# Patient Record
Sex: MALE | Race: WHITE | NOT HISPANIC OR LATINO | Employment: FULL TIME | URBAN - METROPOLITAN AREA
[De-identification: names, ages, dates, MRNs, and addresses within clinical notes are randomized per-mention and may not be internally consistent; named-entity substitution may affect disease eponyms.]

---

## 2023-10-22 ENCOUNTER — APPOINTMENT (EMERGENCY)
Dept: CT IMAGING | Facility: HOSPITAL | Age: 18
End: 2023-10-22
Payer: COMMERCIAL

## 2023-10-22 ENCOUNTER — HOSPITAL ENCOUNTER (EMERGENCY)
Facility: HOSPITAL | Age: 18
Discharge: HOME | End: 2023-10-22
Attending: STUDENT IN AN ORGANIZED HEALTH CARE EDUCATION/TRAINING PROGRAM
Payer: COMMERCIAL
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
## 2023-10-22 DIAGNOSIS — S16.1XXA ACUTE STRAIN OF NECK MUSCLE, INITIAL ENCOUNTER: ICD-10-CM

## 2023-10-22 DIAGNOSIS — M54.2 NECK PAIN: ICD-10-CM

## 2023-10-22 PROCEDURE — 70450 CT HEAD/BRAIN W/O DYE: CPT

## 2023-10-22 PROCEDURE — 99284 EMERGENCY DEPT VISIT MOD MDM: CPT

## 2023-10-22 PROCEDURE — 99284 EMERGENCY DEPT VISIT MOD MDM: CPT | Performed by: STUDENT IN AN ORGANIZED HEALTH CARE EDUCATION/TRAINING PROGRAM

## 2023-10-22 PROCEDURE — G1004 CDSM NDSC: HCPCS

## 2023-10-22 PROCEDURE — 93005 ELECTROCARDIOGRAM TRACING: CPT

## 2023-10-22 PROCEDURE — 72125 CT NECK SPINE W/O DYE: CPT

## 2023-10-22 RX ORDER — ACETAMINOPHEN 325 MG/1
650 TABLET ORAL ONCE
Status: COMPLETED | OUTPATIENT
Start: 2023-10-22 | End: 2023-10-22

## 2023-10-22 RX ADMIN — ACETAMINOPHEN 650 MG: 325 TABLET, FILM COATED ORAL at 19:24

## 2023-10-22 NOTE — Clinical Note
Sandro Son was seen and treated in our emergency department on 10/22/2023. Diagnosis:     Meño Gonzalez  may return to work on return date. He may return on this date: 10/24/2023         If you have any questions or concerns, please don't hesitate to call.       Sophy Cheung, DO    ______________________________           _______________          _______________  Hospital Representative                              Date                                Time
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
## 2023-10-23 LAB
ATRIAL RATE: 100 BPM
P AXIS: 36 DEGREES
PR INTERVAL: 158 MS
QRS AXIS: 84 DEGREES
QRSD INTERVAL: 88 MS
QT INTERVAL: 334 MS
QTC INTERVAL: 430 MS
T WAVE AXIS: 14 DEGREES
VENTRICULAR RATE: 100 BPM

## 2023-10-23 PROCEDURE — 93010 ELECTROCARDIOGRAM REPORT: CPT | Performed by: INTERNAL MEDICINE

## 2023-10-25 VITALS
SYSTOLIC BLOOD PRESSURE: 145 MMHG | BODY MASS INDEX: 34.07 KG/M2 | TEMPERATURE: 98.2 F | HEART RATE: 90 BPM | OXYGEN SATURATION: 98 % | HEIGHT: 69 IN | RESPIRATION RATE: 20 BRPM | DIASTOLIC BLOOD PRESSURE: 90 MMHG | WEIGHT: 230 LBS